# Patient Record
Sex: FEMALE
[De-identification: names, ages, dates, MRNs, and addresses within clinical notes are randomized per-mention and may not be internally consistent; named-entity substitution may affect disease eponyms.]

---

## 2018-07-03 ENCOUNTER — HOSPITAL ENCOUNTER (EMERGENCY)
Dept: HOSPITAL 42 - ED | Age: 38
Discharge: HOME | End: 2018-07-03
Payer: MEDICAID

## 2018-07-03 VITALS — BODY MASS INDEX: 27.7 KG/M2

## 2018-07-03 VITALS — OXYGEN SATURATION: 99 % | HEART RATE: 62 BPM | SYSTOLIC BLOOD PRESSURE: 116 MMHG | DIASTOLIC BLOOD PRESSURE: 70 MMHG

## 2018-07-03 VITALS — TEMPERATURE: 99.6 F | RESPIRATION RATE: 18 BRPM

## 2018-07-03 DIAGNOSIS — N73.9: Primary | ICD-10-CM

## 2018-07-03 LAB
ALBUMIN SERPL-MCNC: 3.9 G/DL (ref 3–4.8)
ALBUMIN/GLOB SERPL: 1.2 {RATIO} (ref 1.1–1.8)
ALT SERPL-CCNC: 21 U/L (ref 7–56)
APPEARANCE UR: CLEAR
AST SERPL-CCNC: 22 U/L (ref 14–36)
BILIRUB UR-MCNC: NEGATIVE MG/DL
BUN SERPL-MCNC: 9 MG/DL (ref 7–21)
CALCIUM SERPL-MCNC: 8.6 MG/DL (ref 8.4–10.5)
COLOR UR: YELLOW
ERYTHROCYTE [DISTWIDTH] IN BLOOD BY AUTOMATED COUNT: 14.4 % (ref 11.5–14.5)
GFR NON-AFRICAN AMERICAN: > 60
GLUCOSE UR STRIP-MCNC: NEGATIVE MG/DL
HCG,QUALITATIVE URINE: NEGATIVE
HGB BLD-MCNC: 9.8 G/DL (ref 12–16)
LEUKOCYTE ESTERASE UR-ACNC: NEGATIVE LEU/UL
LIPASE SERPL-CCNC: 25 U/L (ref 23–300)
MCH RBC QN AUTO: 25.9 PG (ref 25–35)
MCHC RBC AUTO-ENTMCNC: 32.2 G/DL (ref 31–37)
MCV RBC AUTO: 80.4 FL (ref 80–105)
PH UR STRIP: 7.5 [PH] (ref 4.7–8)
PLATELET # BLD: 279 10^3/UL (ref 120–450)
PMV BLD AUTO: 9.4 FL (ref 7–11)
PROT UR STRIP-MCNC: NEGATIVE MG/DL
RBC # BLD AUTO: 3.78 10^6/UL (ref 3.5–6.1)
RBC # UR STRIP: NEGATIVE /UL
SP GR UR STRIP: 1.01 (ref 1–1.03)
UROBILINOGEN UR STRIP-ACNC: 0.2 E.U./DL
WBC # BLD AUTO: 11.4 10^3/UL (ref 4.5–11)

## 2018-07-03 PROCEDURE — 99282 EMERGENCY DEPT VISIT SF MDM: CPT

## 2018-07-03 PROCEDURE — 80053 COMPREHEN METABOLIC PANEL: CPT

## 2018-07-03 PROCEDURE — 81003 URINALYSIS AUTO W/O SCOPE: CPT

## 2018-07-03 PROCEDURE — 83690 ASSAY OF LIPASE: CPT

## 2018-07-03 PROCEDURE — 85027 COMPLETE CBC AUTOMATED: CPT

## 2018-07-03 PROCEDURE — 84703 CHORIONIC GONADOTROPIN ASSAY: CPT

## 2018-07-03 PROCEDURE — 96372 THER/PROPH/DIAG INJ SC/IM: CPT

## 2018-07-03 PROCEDURE — 96361 HYDRATE IV INFUSION ADD-ON: CPT

## 2018-07-03 PROCEDURE — 96374 THER/PROPH/DIAG INJ IV PUSH: CPT

## 2018-07-03 NOTE — ED PDOC
Arrival/HPI





- General


Chief Complaint: Abdominal Pain


Time Seen by Provider: 18 02:35


Historian: Patient





- History of Present Illness


Narrative History of Present Illness (Text): 


18 02:39


Jing Mari is a 38 year old female, whose past medical history includes 

left ovarian cyst and 2 C-sections, who presents to the Emergency department 

complaining of intermittent lower abdominal cramping. Patient states symptoms 

began yesterday but worsened tonight. Patient states her last menstrual period 

was last week and notes she is scheduled for a colposcopy after testing 

positive for HPV. Patient states she had unprotected sexual intercourse 2 weeks 

ago. Patient denies any urinary symptoms, vaginal bleeding, fever, chills, 

nausea, vomiting, diarrhea, back pain, headache, dizziness, or any other 

complaints.





Time/Duration: 24 hours


Symptom Onset: Gradual


Symptom Course: Intermittent


Quality: Cramping


Activities at Onset: Light


Context: Home





Past Medical History





- Provider Review


Nursing Documentation Reviewed: Yes





- Infectious Disease


Hx of Infectious Diseases: None





- Tetanus Immunization


Tetanus Immunization: Unknown





- Cardiac


Hx Cardiac Disorders: No





- Pulmonary


Hx Respiratory Disorders: No





- Neurological


Hx Neurological Disorder: Yes


Hx Migraine: Yes





- HEENT


Hx HEENT Disorder: No





- Renal


Hx Renal Disorder: No





- Endocrine/Metabolic


Hx Endocrine Disorders: No





- Hematological/Oncological


Hx Blood Disorders: No





- Integumentary


Hx Dermatological Disorder: No





- Musculoskeletal/Rheumatological


Hx Musculoskeletal Disorders: No





- Gastrointestinal


Hx Gastrointestinal Disorders: No





- Genitourinary/Gynecological


Hx Genitourinary Disorders: No





- Psychiatric


Hx Psychophysiologic Disorder: No


Hx Substance Use: No





- Surgical History


Hx  Section: Yes (x2)





- Anesthesia


Hx Anesthesia: No





- Suicidal Assessment


Feels Threatened In Home Enviroment: No





Family/Social History





- Physician Review


Nursing Documentation Reviewed: Yes


Family/Social History: Unknown Family HX


Smoking Status: Never Smoked


Hx Alcohol Use: Yes


Frequency of alcohol use: Socially


Hx Substance Use: No


Hx Substance Use Treatment: No





Allergies/Home Meds


Allergies/Adverse Reactions: 


Allergies





No Known Allergies Allergy (Verified 12/06/15 20:13)


 











Review of Systems





- Physician Review


All systems were reviewed & negative as marked: Yes





- Review of Systems


Constitutional: Normal.  absent: Fevers


Eyes: Normal


ENT: Normal


Respiratory: Normal.  absent: SOB, Cough


Cardiovascular: Normal.  absent: Chest Pain


Gastrointestinal: Abdominal Pain.  absent: Diarrhea, Nausea, Vomiting


Genitourinary Female: Normal.  absent: Dysuria, Frequency, Hematuria, Urine 

Output Changes


Musculoskeletal: Normal.  absent: Back Pain, Neck Pain


Skin: Normal.  absent: Rash


Neurological: absent: Dizziness


Endocrine: Normal


Hemo/Lymphatic: Normal


Psychiatric: Normal





Physical Exam


Vital Signs Reviewed: Yes


Vital Signs











  Temp Pulse Resp BP Pulse Ox


 


 18 02:27  99.6 F  88  18  102/64  100











Temperature: Afebrile


Blood Pressure: Normal


Pulse: Regular


Respiratory Rate: Normal


Appearance: Positive for: Well-Appearing, Non-Toxic, Comfortable


Pain Distress: None


Mental Status: Positive for: Alert and Oriented X 3





- Systems Exam


Head: Present: Atraumatic, Normocephalic


Pupils: Present: PERRL


Extroacular Muscles: Present: EOMI


Conjunctiva: Present: Normal


Mouth: Present: Moist Mucous Membranes


Neck: Present: Normal Range of Motion


Respiratory/Chest: Present: Clear to Auscultation, Good Air Exchange.  No: 

Respiratory Distress, Accessory Muscle Use


Cardiovascular: Present: Regular Rate and Rhythm, Normal S1, S2.  No: Murmurs


Abdomen: No: Tenderness, Distention, Peritoneal Signs


Genitourinary/Pelvic Exam: Present: Vaginal Discharge (White-yellowish vaginal 

discharge), Cervical Motion Tendernes


Back: Present: Normal Inspection


Upper Extremity: Present: Normal Inspection.  No: Cyanosis, Edema


Lower Extremity: Present: Normal Inspection.  No: Edema


Neurological: Present: GCS=15, CN II-XII Intact, Speech Normal


Skin: Present: Warm, Dry, Normal Color.  No: Rashes


Psychiatric: Present: Alert, Oriented x 3, Normal Insight, Normal Concentration





Medical Decision Making


ED Course and Treatment: 


18 02:40


Impression:


38 year old female complaining of lower abdominal cramping.





Plan:


-- Labs, lipase


-- Urinalysis


-- IV fluids


-- Toradol


-- Reassess and disposition





Progress Notes:


18 05:30


On re-evaluation, patient feels better and is in no acute distress. I have 

discussed the results and plan with the patient, who expresses understanding. 

Patient in agreement with plan to be discharged home. Patient is stable for 

discharge. Patient was instructed to follow up with physician or return if 

symptoms worsen or new concerning symptoms arise.








- Lab Interpretations


Lab Results: 








 18 02:55 





 18 02:55 





 Lab Results





18 04:55: Urine Color Yellow, Urine Appearance Clear, Urine pH 7.5, Ur 

Specific Gravity 1.010, Urine Protein Negative, Urine Glucose (UA) Negative, 

Urine Ketones Negative, Urine Blood Negative, Urine Nitrate Negative, Urine 

Bilirubin Negative, Urine Urobilinogen 0.2, Ur Leukocyte Esterase Negative, 

Urine HCG, Qual Negative


18 02:55: WBC 11.4 H, RBC 3.78, Hgb 9.8 L, Hct 30.4 L, MCV 80.4, MCH 25.9

, MCHC 32.2, RDW 14.4, Plt Count 279, MPV 9.4


18 02:55: Sodium 139, Potassium 4.0, Chloride 103, Carbon Dioxide 26, 

Anion Gap 14, BUN 9, Creatinine 0.7, Est GFR (African Amer) > 60, Est GFR (Non-

Af Amer) > 60, Random Glucose 95, Calcium 8.6, Total Bilirubin 0.3, AST 22, ALT 

21, Alkaline Phosphatase 61, Total Protein 7.0, Albumin 3.9, Globulin 3.1, 

Albumin/Globulin Ratio 1.2, Lipase 25








I have reviewed the lab results: Yes





- Medication Orders


Current Medication Orders: 











Discontinued Medications





Azithromycin (Zithromax)  1,000 mg PO ONCE STA


   PRN Reason: Protocol


   Stop: 18 05:29


Ceftriaxone Sodium (Rocephin)  250 mg IM STAT STA


   PRN Reason: Protocol


   Stop: 18 05:28


Sodium Chloride (Sodium Chloride 0.9%)  1,000 mls @ 999 mls/hr IV .Q1H1M STA


   Stop: 18 03:40


   Last Admin: 18 03:14  Dose: 999 mls/hr





eMAR Start Stop


 Document     18 03:14  RD  (Rec: 18 03:15  RD  [facility] [place]-[id number])


     Intravenous Solution


      Start Date                                 18


      Start Time                                 02:55


      End Date                                   18


      End time                                   03:55


      Total Infusion Time                        60





Ketorolac Tromethamine (Toradol)  30 mg IVP ONCE ONE


   Stop: 18 02:43


   Last Admin: 18 03:15  Dose: 30 mg





MAR Pain Assessment


 Document     18 03:15  RD  (Rec: 18 03:15  RD  [facility] [place]-[id number])


     Pain Reassessment


      Is this a pain reassessment?               No


     Sleep


      Is patient sleeping during reassessment?   No


     Presence of Pain


      Presence of Pain                           Yes


IVP Administration


 Document     18 03:15  RD  (Rec: 18 03:15  RD  [facility] [place]-[id number])


     Charges for Administration


      # of IVP Administrations                   1














Disposition/Present on Arrival





- Present on Arrival


Any Indicators Present on Arrival: No


History of DVT/PE: No


History of Uncontrolled Diabetes: No


Urinary Catheter: No


History of Decub. Ulcer: No


History Surgical Site Infection Following: None





- Disposition


Have Diagnosis and Disposition been Completed?: Yes


Diagnosis: 


 PID (pelvic inflammatory disease)





Disposition: HOME/ ROUTINE


Disposition Time: 05:34


Patient Plan: Discharge


Patient Problems: 


 Current Active Problems











Problem Status Onset


 


PID (pelvic inflammatory disease) Acute  











Condition: GOOD


Discharge Instructions (ExitCare):  Pelvic Inflammatory Disease (DC)


Additional Instructions: 


Follow up with your gynecologist this week/advil as directed/no sexual 

intercourse until seen by your gynecologist


Forms:  [employer] (English)